# Patient Record
Sex: FEMALE | Race: WHITE | NOT HISPANIC OR LATINO | Employment: OTHER | URBAN - METROPOLITAN AREA
[De-identification: names, ages, dates, MRNs, and addresses within clinical notes are randomized per-mention and may not be internally consistent; named-entity substitution may affect disease eponyms.]

---

## 2019-02-18 ENCOUNTER — HOSPITAL ENCOUNTER (EMERGENCY)
Facility: HOSPITAL | Age: 34
Discharge: HOME | End: 2019-02-18
Attending: EMERGENCY MEDICINE
Payer: MEDICAID

## 2019-02-18 VITALS
OXYGEN SATURATION: 99 % | HEIGHT: 64 IN | SYSTOLIC BLOOD PRESSURE: 123 MMHG | WEIGHT: 135 LBS | HEART RATE: 63 BPM | BODY MASS INDEX: 23.05 KG/M2 | RESPIRATION RATE: 18 BRPM | TEMPERATURE: 99 F | DIASTOLIC BLOOD PRESSURE: 77 MMHG

## 2019-02-18 DIAGNOSIS — L08.9 INFECTED SEBACEOUS CYST: Primary | ICD-10-CM

## 2019-02-18 DIAGNOSIS — L72.3 INFECTED SEBACEOUS CYST: Primary | ICD-10-CM

## 2019-02-18 PROCEDURE — 10060 I&D ABSCESS SIMPLE/SINGLE: CPT

## 2019-02-18 PROCEDURE — 99283 EMERGENCY DEPT VISIT LOW MDM: CPT | Mod: 25

## 2019-02-18 PROCEDURE — 10061 I&D ABSCESS COMP/MULTIPLE: CPT

## 2019-02-18 PROCEDURE — 25000003 PHARM REV CODE 250: Performed by: EMERGENCY MEDICINE

## 2019-02-18 RX ORDER — IBUPROFEN 600 MG/1
600 TABLET ORAL
Status: COMPLETED | OUTPATIENT
Start: 2019-02-18 | End: 2019-02-18

## 2019-02-18 RX ORDER — SULFAMETHOXAZOLE AND TRIMETHOPRIM 800; 160 MG/1; MG/1
1 TABLET ORAL
Status: COMPLETED | OUTPATIENT
Start: 2019-02-18 | End: 2019-02-18

## 2019-02-18 RX ORDER — SULFAMETHOXAZOLE AND TRIMETHOPRIM 800; 160 MG/1; MG/1
1 TABLET ORAL 2 TIMES DAILY
Qty: 14 TABLET | Refills: 0 | Status: SHIPPED | OUTPATIENT
Start: 2019-02-18 | End: 2019-02-25

## 2019-02-18 RX ORDER — LIDOCAINE HCL/EPINEPHRINE/PF 2%-1:200K
10 VIAL (ML) INJECTION ONCE
Status: COMPLETED | OUTPATIENT
Start: 2019-02-18 | End: 2019-02-18

## 2019-02-18 RX ADMIN — SULFAMETHOXAZOLE AND TRIMETHOPRIM 1 TABLET: 800; 160 TABLET ORAL at 09:02

## 2019-02-18 RX ADMIN — IBUPROFEN 600 MG: 600 TABLET, FILM COATED ORAL at 09:02

## 2019-02-18 RX ADMIN — LIDOCAINE HYDROCHLORIDE,EPINEPHRINE BITARTRATE 10 ML: 20; .005 INJECTION, SOLUTION EPIDURAL; INFILTRATION; INTRACAUDAL; PERINEURAL at 09:02

## 2019-02-19 NOTE — ED PROVIDER NOTES
"Encounter Date: 2/18/2019    SCRIBE #1 NOTE: I, Riya Gil, am scribing for, and in the presence of, Dr. Basurto.       History     Chief Complaint   Patient presents with    Abscess     behind right ear        Time seen by provider: 9:32 PM on 02/18/2019    Sanjuanita Vo is a 33 y.o. female with no pertinent medical history who presents to the ED with ear pain onset today. Patient complains of a bump behind her right ear that has been present for a long time, however began feeling pain, warmth, and noticed redness to the area today. Patient denies any ear drainage, decreased hearing, shortness of breath, nausea, vomiting, cough, or fever. She reports having a frequent history of "cysts".         The history is provided by the patient. No  was used.     Review of patient's allergies indicates:  No Known Allergies  No past medical history on file.  Past Surgical History:   Procedure Laterality Date    DILATION AND CURETTAGE OF UTERUS  2007    DILATION AND CURETTAGE OF UTERUS  2007     No family history on file.  Social History     Tobacco Use    Smoking status: Current Every Day Smoker     Packs/day: 0.75     Years: 15.00     Pack years: 11.25    Smokeless tobacco: Never Used   Substance Use Topics    Alcohol use: Yes     Comment: occasionally    Drug use: No     Review of Systems   Constitutional: Negative for activity change, appetite change, chills, fatigue and fever.   HENT: Negative for congestion, drooling, ear discharge, facial swelling, sore throat and trouble swallowing.    Eyes: Negative for photophobia, redness and visual disturbance.   Respiratory: Negative for apnea, cough and shortness of breath.    Cardiovascular: Negative for chest pain and palpitations.   Gastrointestinal: Negative for abdominal distention, abdominal pain, diarrhea, nausea and vomiting.   Genitourinary: Negative for difficulty urinating, dysuria and hematuria.   Musculoskeletal: Negative for back pain " and neck pain.   Skin: Positive for color change. Negative for pallor and rash.   Neurological: Negative for weakness, numbness and headaches.   Hematological: Does not bruise/bleed easily.   Psychiatric/Behavioral: Negative for agitation and confusion.       Physical Exam     Initial Vitals [02/18/19 2130]   BP Pulse Resp Temp SpO2   123/77 63 18 99 °F (37.2 °C) 99 %      MAP       --         Physical Exam    Nursing note and vitals reviewed.  Constitutional: She appears well-developed and well-nourished.   HENT:   Head: Normocephalic and atraumatic.   1 cm right sided post-auricular nodule with erythema and tenderness.   Eyes: Conjunctivae are normal.   Neck: Normal range of motion. Neck supple.   Cardiovascular: Normal rate, regular rhythm and normal heart sounds. Exam reveals no gallop and no friction rub.    No murmur heard.  Pulmonary/Chest: Effort normal and breath sounds normal. No respiratory distress. She has no wheezes. She has no rhonchi. She has no rales.   Abdominal: Soft. She exhibits no distension. There is no tenderness.   Musculoskeletal: Normal range of motion.   Neurological: She is alert and oriented to person, place, and time.   Skin: Skin is warm and dry. There is erythema.   .    Psychiatric: She has a normal mood and affect.         ED Course   I & D - Incision and Drainage  Date/Time: 2/18/2019 9:45 PM  Location procedure was performed: [facility] EMERGENCY DEPARTMENT  Performed by: Aamir Basurto III, MD  Authorized by: Aamir Basurto III, MD   Consent Done: Not Needed  Type: cyst  Body area: head/neck (R post-auricular)    Anesthesia:  Local Anesthetic: lidocaine 2% with epinephrine  Anesthetic total: 1 mL  Patient sedated: no  Scalpel size: 11  Incision type: single straight  Complexity: simple  Drainage: pus and  purulent  Drainage amount: scant  Wound treatment: incision,  deloculation,  expression of material,  removal of cyst capsule and  wound left open  Complications: No  Specimens:  No  Implants: No  Patient tolerance: Patient tolerated the procedure well with no immediate complications  Comments: Covered with sterile, non stick gauze and tape.         Labs Reviewed - No data to display       Imaging Results    None          Medical Decision Making:   History:   Old Medical Records: I decided to obtain old medical records.  ED Management:  33-year-old female presents with retroauricular pain secondary to an infected sebaceous cyst.  Cyst is incised and drained.  She has no surrounding erythema with no evidence of cellulitis; however, she will be placed on Bactrim.  There is no evidence of mastoiditis.       APC / Resident Notes:   I, Dr. Aamir Basurto III, personally performed the services described in this documentation. All medical record entries made by the scribe were at my direction and in my presence.  I have reviewed the chart and agree that the record reflects my personal performance and is accurate and complete       Scribe Attestation:   Scribe #1: I performed the above scribed service and the documentation accurately describes the services I performed. I attest to the accuracy of the note.               Clinical Impression:   The encounter diagnosis was Infected sebaceous cyst.      Disposition:   Disposition: Discharged  Condition: Stable                        Aamir Basurto III, MD  02/20/19 0906

## 2019-11-11 ENCOUNTER — HOSPITAL ENCOUNTER (EMERGENCY)
Facility: HOSPITAL | Age: 34
Discharge: HOME | End: 2019-11-11
Attending: EMERGENCY MEDICINE
Payer: MEDICAID

## 2019-11-11 VITALS
RESPIRATION RATE: 16 BRPM | TEMPERATURE: 99 F | OXYGEN SATURATION: 100 % | DIASTOLIC BLOOD PRESSURE: 78 MMHG | WEIGHT: 145 LBS | HEART RATE: 76 BPM | SYSTOLIC BLOOD PRESSURE: 115 MMHG | BODY MASS INDEX: 24.89 KG/M2

## 2019-11-11 DIAGNOSIS — L03.90 CELLULITIS, UNSPECIFIED CELLULITIS SITE: ICD-10-CM

## 2019-11-11 DIAGNOSIS — M79.645 FINGER PAIN, LEFT: Primary | ICD-10-CM

## 2019-11-11 LAB
B-HCG UR QL: POSITIVE
CTP QC/QA: YES

## 2019-11-11 PROCEDURE — 99283 EMERGENCY DEPT VISIT LOW MDM: CPT

## 2019-11-11 PROCEDURE — 81025 URINE PREGNANCY TEST: CPT | Performed by: EMERGENCY MEDICINE

## 2019-11-11 RX ORDER — SULFAMETHOXAZOLE AND TRIMETHOPRIM 800; 160 MG/1; MG/1
1 TABLET ORAL 2 TIMES DAILY
Qty: 14 TABLET | Refills: 0 | Status: SHIPPED | OUTPATIENT
Start: 2019-11-11 | End: 2019-11-11 | Stop reason: CLARIF

## 2019-11-11 RX ORDER — CLINDAMYCIN HYDROCHLORIDE 300 MG/1
300 CAPSULE ORAL EVERY 6 HOURS
Qty: 40 CAPSULE | Refills: 0 | Status: SHIPPED | OUTPATIENT
Start: 2019-11-11 | End: 2019-11-21

## 2019-11-11 RX ORDER — HYDROCODONE BITARTRATE AND ACETAMINOPHEN 10; 325 MG/1; MG/1
1 TABLET ORAL EVERY 6 HOURS PRN
Qty: 12 TABLET | Refills: 0 | Status: SHIPPED | OUTPATIENT
Start: 2019-11-11 | End: 2019-11-11 | Stop reason: CLARIF

## 2019-11-11 RX ORDER — MUPIROCIN 20 MG/G
OINTMENT TOPICAL 3 TIMES DAILY
Qty: 1 TUBE | Refills: 0 | Status: ON HOLD | OUTPATIENT
Start: 2019-11-11 | End: 2020-06-25

## 2019-11-11 NOTE — ED PROVIDER NOTES
Encounter Date: 11/11/2019       History     Chief Complaint   Patient presents with    Hand Pain     L hand index finger pain     Chief complaint is left index finger pain.  It began tonight.  She has redness and pain to the ulnar aspect of the distal finger tip of the left index finger.  No paronychia noted. Good range of motion.        Review of patient's allergies indicates:  No Known Allergies  No past medical history on file.  Past Surgical History:   Procedure Laterality Date    DILATION AND CURETTAGE OF UTERUS  2007    DILATION AND CURETTAGE OF UTERUS  2007     No family history on file.  Social History     Tobacco Use    Smoking status: Current Every Day Smoker     Packs/day: 0.75     Years: 15.00     Pack years: 11.25    Smokeless tobacco: Never Used   Substance Use Topics    Alcohol use: Yes     Comment: occasionally    Drug use: No     Review of Systems   Constitutional: Negative for chills and fever.   HENT: Negative for ear pain, rhinorrhea and sore throat.    Eyes: Negative for pain and visual disturbance.   Respiratory: Negative for cough and shortness of breath.    Cardiovascular: Negative for chest pain and palpitations.   Gastrointestinal: Negative for abdominal pain, constipation, diarrhea, nausea and vomiting.   Genitourinary: Negative for dysuria, frequency, hematuria and urgency.   Musculoskeletal: Negative for back pain, joint swelling and myalgias.   Skin: Negative for rash.        Finger pain   Neurological: Negative for dizziness, seizures, weakness and headaches.   Psychiatric/Behavioral: Negative for dysphoric mood. The patient is not nervous/anxious.        Physical Exam     Initial Vitals [11/11/19 0219]   BP Pulse Resp Temp SpO2   107/66 78 16 98.2 °F (36.8 °C) --      MAP       --         Physical Exam    Nursing note and vitals reviewed.  Constitutional: She appears well-developed and well-nourished.   HENT:   Head: Normocephalic and atraumatic.   Eyes: Conjunctivae, EOM and  lids are normal. Pupils are equal, round, and reactive to light.   Neck: Trachea normal and normal range of motion. Neck supple. No thyroid mass and no thyromegaly present.   Cardiovascular: Normal rate, regular rhythm and normal heart sounds.   Pulmonary/Chest: Effort normal and breath sounds normal.   Abdominal: Soft. Normal appearance and bowel sounds are normal. There is no tenderness.   Musculoskeletal: Normal range of motion.   Neurological: She is alert and oriented to person, place, and time. She has normal strength and normal reflexes. No cranial nerve deficit or sensory deficit.   Skin: Skin is warm and dry.   Patient has redness and tenderness to the outer aspect of the left index finger tip.  No paronychia noted.  Her nails are cut close to the skin.  No fluctuance noted no drainage noted.  Full range of motion of the finger tip neurovascular function intact   Psychiatric: She has a normal mood and affect. Her speech is normal and behavior is normal. Judgment and thought content normal.         ED Course   Procedures  Labs Reviewed - No data to display       Imaging Results    None                                          Clinical Impression:       ICD-10-CM ICD-9-CM   1. Finger pain, left M79.645 729.5   2. Cellulitis, unspecified cellulitis site L03.90 682.9                             Carolann Cohen MD  11/12/19 0105

## 2019-11-12 LAB
ABO + RH BLD: NORMAL
C TRACH RRNA SPEC QL PROBE: NEGATIVE
HBV SURFACE AG SERPL QL IA: NEGATIVE
HIV 1+2 AB+HIV1 P24 AG SERPL QL IA: NEGATIVE
INDIRECT COOMBS: NEGATIVE
N GONORRHOEAE, AMPLIFIED DNA: NEGATIVE
RPR: NON REACTIVE
RUBELLA IMMUNE STATUS: NORMAL

## 2020-06-25 ENCOUNTER — ANESTHESIA EVENT (OUTPATIENT)
Dept: OBSTETRICS AND GYNECOLOGY | Facility: HOSPITAL | Age: 35
End: 2020-06-25
Payer: MEDICAID

## 2020-06-25 ENCOUNTER — ANESTHESIA (OUTPATIENT)
Dept: OBSTETRICS AND GYNECOLOGY | Facility: HOSPITAL | Age: 35
End: 2020-06-25
Payer: MEDICAID

## 2020-06-25 ENCOUNTER — HOSPITAL ENCOUNTER (INPATIENT)
Facility: HOSPITAL | Age: 35
LOS: 2 days | Discharge: HOME | End: 2020-06-27
Attending: SPECIALIST | Admitting: SPECIALIST
Payer: MEDICAID

## 2020-06-25 DIAGNOSIS — R10.9 ABDOMINAL PAIN AFFECTING PREGNANCY: ICD-10-CM

## 2020-06-25 DIAGNOSIS — O26.899 ABDOMINAL PAIN AFFECTING PREGNANCY: ICD-10-CM

## 2020-06-25 DIAGNOSIS — Z37.9 NORMAL LABOR: ICD-10-CM

## 2020-06-25 LAB
ABO + RH BLD: NORMAL
AMPHET+METHAMPHET UR QL: NEGATIVE
BARBITURATES UR QL SCN>200 NG/ML: NORMAL
BASOPHILS # BLD AUTO: 0.07 K/UL (ref 0–0.2)
BASOPHILS NFR BLD: 0.6 % (ref 0–1.9)
BENZODIAZ UR QL SCN>200 NG/ML: NEGATIVE
BILIRUB UR QL STRIP: NEGATIVE
BLD GP AB SCN CELLS X3 SERPL QL: NORMAL
BZE UR QL SCN: NEGATIVE
CANNABINOIDS UR QL SCN: NEGATIVE
CLARITY UR: CLEAR
COLOR UR: YELLOW
CREAT UR-MCNC: 48 MG/DL (ref 15–325)
CTP QC/QA: YES
DIFFERENTIAL METHOD: ABNORMAL
EOSINOPHIL # BLD AUTO: 0.1 K/UL (ref 0–0.5)
EOSINOPHIL NFR BLD: 0.9 % (ref 0–8)
ERYTHROCYTE [DISTWIDTH] IN BLOOD BY AUTOMATED COUNT: 14.1 % (ref 11.5–14.5)
GLUCOSE UR QL STRIP: NEGATIVE
HCT VFR BLD AUTO: 35.1 % (ref 37–48.5)
HGB BLD-MCNC: 11.8 G/DL (ref 12–16)
HGB UR QL STRIP: ABNORMAL
IMM GRANULOCYTES # BLD AUTO: 0.03 K/UL (ref 0–0.04)
IMM GRANULOCYTES NFR BLD AUTO: 0.3 % (ref 0–0.5)
KETONES UR QL STRIP: ABNORMAL
LEUKOCYTE ESTERASE UR QL STRIP: NEGATIVE
LYMPHOCYTES # BLD AUTO: 2.2 K/UL (ref 1–4.8)
LYMPHOCYTES NFR BLD: 20.4 % (ref 18–48)
MCH RBC QN AUTO: 29.1 PG (ref 27–31)
MCHC RBC AUTO-ENTMCNC: 33.6 G/DL (ref 32–36)
MCV RBC AUTO: 87 FL (ref 82–98)
MONOCYTES # BLD AUTO: 1 K/UL (ref 0.3–1)
MONOCYTES NFR BLD: 9.4 % (ref 4–15)
NEUTROPHILS # BLD AUTO: 7.5 K/UL (ref 1.8–7.7)
NEUTROPHILS NFR BLD: 68.4 % (ref 38–73)
NITRITE UR QL STRIP: NEGATIVE
NRBC BLD-RTO: 0 /100 WBC
OPIATES UR QL SCN: NEGATIVE
PCP UR QL SCN>25 NG/ML: NEGATIVE
PCP UR QL SCN>25 NG/ML: NEGATIVE
PH UR STRIP: 8 [PH] (ref 5–8)
PLATELET # BLD AUTO: 316 K/UL (ref 150–350)
PMV BLD AUTO: 9.8 FL (ref 9.2–12.9)
POC PH, VAGINAL: 4.5 (ref 4.5–5.5)
PROT UR QL STRIP: NEGATIVE
RBC # BLD AUTO: 4.05 M/UL (ref 4–5.4)
RUPTURE OF MEMBRANE: POSITIVE
SARS-COV-2 RDRP RESP QL NAA+PROBE: NEGATIVE
SP GR UR STRIP: 1.01 (ref 1–1.03)
TOXICOLOGY INFORMATION: NORMAL
URN SPEC COLLECT METH UR: ABNORMAL
UROBILINOGEN UR STRIP-ACNC: NEGATIVE EU/DL
WBC # BLD AUTO: 10.97 K/UL (ref 3.9–12.7)

## 2020-06-25 PROCEDURE — 81003 URINALYSIS AUTO W/O SCOPE: CPT

## 2020-06-25 PROCEDURE — 86850 RBC ANTIBODY SCREEN: CPT

## 2020-06-25 PROCEDURE — U0002 COVID-19 LAB TEST NON-CDC: HCPCS

## 2020-06-25 PROCEDURE — 72200004 HC VAGINAL DELIVERY LEVEL I

## 2020-06-25 PROCEDURE — 25000003 PHARM REV CODE 250: Performed by: SPECIALIST

## 2020-06-25 PROCEDURE — 12000002 HC ACUTE/MED SURGE SEMI-PRIVATE ROOM

## 2020-06-25 PROCEDURE — 80307 DRUG TEST PRSMV CHEM ANLYZR: CPT

## 2020-06-25 PROCEDURE — 63600175 PHARM REV CODE 636 W HCPCS: Performed by: STUDENT IN AN ORGANIZED HEALTH CARE EDUCATION/TRAINING PROGRAM

## 2020-06-25 PROCEDURE — 86592 SYPHILIS TEST NON-TREP QUAL: CPT

## 2020-06-25 PROCEDURE — 63600175 PHARM REV CODE 636 W HCPCS: Performed by: SPECIALIST

## 2020-06-25 PROCEDURE — 62326 NJX INTERLAMINAR LMBR/SAC: CPT | Performed by: STUDENT IN AN ORGANIZED HEALTH CARE EDUCATION/TRAINING PROGRAM

## 2020-06-25 PROCEDURE — C1751 CATH, INF, PER/CENT/MIDLINE: HCPCS | Performed by: STUDENT IN AN ORGANIZED HEALTH CARE EDUCATION/TRAINING PROGRAM

## 2020-06-25 PROCEDURE — 85025 COMPLETE CBC W/AUTO DIFF WBC: CPT

## 2020-06-25 RX ORDER — ONDANSETRON 4 MG/1
8 TABLET, ORALLY DISINTEGRATING ORAL EVERY 8 HOURS PRN
Status: DISCONTINUED | OUTPATIENT
Start: 2020-06-25 | End: 2020-06-27 | Stop reason: HOSPADM

## 2020-06-25 RX ORDER — BUTALBITAL, ACETAMINOPHEN AND CAFFEINE 50; 325; 40 MG/1; MG/1; MG/1
1 TABLET ORAL EVERY 6 HOURS PRN
COMMUNITY

## 2020-06-25 RX ORDER — DIPHENHYDRAMINE HCL 25 MG
25 CAPSULE ORAL EVERY 4 HOURS PRN
Status: DISCONTINUED | OUTPATIENT
Start: 2020-06-25 | End: 2020-06-27 | Stop reason: HOSPADM

## 2020-06-25 RX ORDER — SIMETHICONE 80 MG
1 TABLET,CHEWABLE ORAL 4 TIMES DAILY PRN
Status: DISCONTINUED | OUTPATIENT
Start: 2020-06-25 | End: 2020-06-25

## 2020-06-25 RX ORDER — DIPHENHYDRAMINE HYDROCHLORIDE 50 MG/ML
12.5 INJECTION INTRAMUSCULAR; INTRAVENOUS EVERY 4 HOURS PRN
Status: DISCONTINUED | OUTPATIENT
Start: 2020-06-25 | End: 2020-06-25

## 2020-06-25 RX ORDER — BUTORPHANOL TARTRATE 2 MG/ML
2 INJECTION INTRAMUSCULAR; INTRAVENOUS
Status: DISCONTINUED | OUTPATIENT
Start: 2020-06-25 | End: 2020-06-25

## 2020-06-25 RX ORDER — DOCUSATE SODIUM 100 MG/1
200 CAPSULE, LIQUID FILLED ORAL 2 TIMES DAILY PRN
Status: DISCONTINUED | OUTPATIENT
Start: 2020-06-25 | End: 2020-06-27 | Stop reason: HOSPADM

## 2020-06-25 RX ORDER — HYDROCORTISONE 25 MG/G
CREAM TOPICAL 3 TIMES DAILY PRN
Status: DISCONTINUED | OUTPATIENT
Start: 2020-06-25 | End: 2020-06-27 | Stop reason: HOSPADM

## 2020-06-25 RX ORDER — CALCIUM CARBONATE 200(500)MG
500 TABLET,CHEWABLE ORAL 3 TIMES DAILY PRN
Status: DISCONTINUED | OUTPATIENT
Start: 2020-06-25 | End: 2020-06-25

## 2020-06-25 RX ORDER — SODIUM CHLORIDE, SODIUM LACTATE, POTASSIUM CHLORIDE, CALCIUM CHLORIDE 600; 310; 30; 20 MG/100ML; MG/100ML; MG/100ML; MG/100ML
INJECTION, SOLUTION INTRAVENOUS CONTINUOUS
Status: DISCONTINUED | OUTPATIENT
Start: 2020-06-25 | End: 2020-06-25

## 2020-06-25 RX ORDER — BUTORPHANOL TARTRATE 2 MG/ML
1 INJECTION INTRAMUSCULAR; INTRAVENOUS
Status: DISCONTINUED | OUTPATIENT
Start: 2020-06-25 | End: 2020-06-25

## 2020-06-25 RX ORDER — FENTANYL/BUPIVACAINE/NS/PF 2-625MCG/1
PLASTIC BAG, INJECTION (ML) INJECTION CONTINUOUS
Status: DISCONTINUED | OUTPATIENT
Start: 2020-06-25 | End: 2020-06-27 | Stop reason: HOSPADM

## 2020-06-25 RX ORDER — TERBINAFINE HYDROCHLORIDE 250 MG/1
250 TABLET ORAL DAILY
COMMUNITY

## 2020-06-25 RX ORDER — DIPHENHYDRAMINE HYDROCHLORIDE 50 MG/ML
25 INJECTION INTRAMUSCULAR; INTRAVENOUS EVERY 4 HOURS PRN
Status: DISCONTINUED | OUTPATIENT
Start: 2020-06-25 | End: 2020-06-27 | Stop reason: HOSPADM

## 2020-06-25 RX ORDER — ACETAMINOPHEN 500 MG
500 TABLET ORAL EVERY 6 HOURS PRN
Status: DISCONTINUED | OUTPATIENT
Start: 2020-06-25 | End: 2020-06-25

## 2020-06-25 RX ORDER — OXYCODONE AND ACETAMINOPHEN 5; 325 MG/1; MG/1
1 TABLET ORAL EVERY 4 HOURS PRN
Status: DISCONTINUED | OUTPATIENT
Start: 2020-06-25 | End: 2020-06-27 | Stop reason: HOSPADM

## 2020-06-25 RX ORDER — PROMETHAZINE HYDROCHLORIDE 25 MG/1
25 TABLET ORAL EVERY 6 HOURS PRN
Status: DISCONTINUED | OUTPATIENT
Start: 2020-06-25 | End: 2020-06-27 | Stop reason: HOSPADM

## 2020-06-25 RX ORDER — ONDANSETRON 4 MG/1
8 TABLET, ORALLY DISINTEGRATING ORAL EVERY 8 HOURS PRN
Status: DISCONTINUED | OUTPATIENT
Start: 2020-06-25 | End: 2020-06-25

## 2020-06-25 RX ORDER — NALOXONE HCL 0.4 MG/ML
0.4 VIAL (ML) INJECTION SEE ADMIN INSTRUCTIONS
Status: DISCONTINUED | OUTPATIENT
Start: 2020-06-25 | End: 2020-06-25

## 2020-06-25 RX ORDER — EPHEDRINE SULFATE 50 MG/ML
10 INJECTION, SOLUTION INTRAVENOUS ONCE AS NEEDED
Status: DISCONTINUED | OUTPATIENT
Start: 2020-06-25 | End: 2020-06-25

## 2020-06-25 RX ORDER — EPHEDRINE SULFATE 50 MG/ML
5 INJECTION, SOLUTION INTRAVENOUS ONCE
Status: COMPLETED | OUTPATIENT
Start: 2020-06-25 | End: 2020-06-25

## 2020-06-25 RX ORDER — OXYCODONE AND ACETAMINOPHEN 10; 325 MG/1; MG/1
1 TABLET ORAL EVERY 4 HOURS PRN
Status: DISCONTINUED | OUTPATIENT
Start: 2020-06-25 | End: 2020-06-27 | Stop reason: HOSPADM

## 2020-06-25 RX ORDER — OXYTOCIN-SODIUM CHLORIDE 0.9% IV SOLN 30 UNIT/500ML 30-0.9/5 UT/ML-%
30 SOLUTION INTRAVENOUS ONCE
Status: DISCONTINUED | OUTPATIENT
Start: 2020-06-26 | End: 2020-06-27 | Stop reason: HOSPADM

## 2020-06-25 RX ORDER — OXYTOCIN-SODIUM CHLORIDE 0.9% IV SOLN 30 UNIT/500ML 30-0.9/5 UT/ML-%
2 SOLUTION INTRAVENOUS CONTINUOUS
Status: DISCONTINUED | OUTPATIENT
Start: 2020-06-25 | End: 2020-06-25

## 2020-06-25 RX ORDER — MISOPROSTOL 100 UG/1
600 TABLET ORAL
Status: DISCONTINUED | OUTPATIENT
Start: 2020-06-25 | End: 2020-06-25

## 2020-06-25 RX ORDER — SODIUM CHLORIDE 9 MG/ML
INJECTION, SOLUTION INTRAVENOUS
Status: DISCONTINUED | OUTPATIENT
Start: 2020-06-25 | End: 2020-06-25

## 2020-06-25 RX ORDER — ONDANSETRON 2 MG/ML
4 INJECTION INTRAMUSCULAR; INTRAVENOUS ONCE
Status: COMPLETED | OUTPATIENT
Start: 2020-06-25 | End: 2020-06-25

## 2020-06-25 RX ADMIN — ONDANSETRON 4 MG: 2 INJECTION INTRAMUSCULAR; INTRAVENOUS at 09:06

## 2020-06-25 RX ADMIN — DEXTROSE 2.5 MILLION UNITS: 50 INJECTION, SOLUTION INTRAVENOUS at 05:06

## 2020-06-25 RX ADMIN — PROMETHAZINE HYDROCHLORIDE 12.5 MG: 25 INJECTION INTRAMUSCULAR; INTRAVENOUS at 05:06

## 2020-06-25 RX ADMIN — SODIUM CHLORIDE, SODIUM LACTATE, POTASSIUM CHLORIDE, AND CALCIUM CHLORIDE: .6; .31; .03; .02 INJECTION, SOLUTION INTRAVENOUS at 07:06

## 2020-06-25 RX ADMIN — PENICILLIN G POTASSIUM 5 MILLION UNITS: 5000000 INJECTION, POWDER, FOR SOLUTION INTRAMUSCULAR; INTRAVENOUS at 01:06

## 2020-06-25 RX ADMIN — DEXTROSE 2.5 MILLION UNITS: 50 INJECTION, SOLUTION INTRAVENOUS at 09:06

## 2020-06-25 RX ADMIN — Medication 2 MILLI-UNITS/MIN: at 01:06

## 2020-06-25 RX ADMIN — OXYCODONE AND ACETAMINOPHEN 1 TABLET: 5; 325 TABLET ORAL at 10:06

## 2020-06-25 RX ADMIN — EPHEDRINE SULFATE 5 MG: 50 INJECTION, SOLUTION INTRAVENOUS at 09:06

## 2020-06-25 RX ADMIN — BUTORPHANOL TARTRATE 2 MG: 2 INJECTION, SOLUTION INTRAMUSCULAR; INTRAVENOUS at 05:06

## 2020-06-25 RX ADMIN — SODIUM CHLORIDE, SODIUM LACTATE, POTASSIUM CHLORIDE, AND CALCIUM CHLORIDE: .6; .31; .03; .02 INJECTION, SOLUTION INTRAVENOUS at 01:06

## 2020-06-25 NOTE — NURSING
1120- pt presents to unit with c/o abdimal pain and leaking fluid since 5pm yesterday. Oriented to LR 5.     1130- ROM plus performed, pink tinged fluid noted to q tip.     1132- Pt instructed to bear down and cough. Small trickle of blood tinged fluid noted from vagina. Fluid tested with nitrazine and reslults negative.     1150- Dr. Sood notified of pt's arrival, complaints and assessment findings. New order for u/s for ESTHER given.     ROM plus positive    1155- Exp of plan of care discussed with patient and sig other. Verb understanding.     1240- u/s complete. Verbal report of ESTHER 4.75.     1250- Dr. Sood notified of u/s results and that pt still noted with leaking. Admit orders given for labor/ SROM

## 2020-06-25 NOTE — PLAN OF CARE
Ongoing assessment, acceptable pain management, Continuous EFM and tocometry. Vaginal bleeding wnl, anticipate . Reassess pt needs prn

## 2020-06-26 LAB
BASOPHILS # BLD AUTO: 0.04 K/UL (ref 0–0.2)
BASOPHILS NFR BLD: 0.2 % (ref 0–1.9)
DIFFERENTIAL METHOD: ABNORMAL
EOSINOPHIL # BLD AUTO: 0.2 K/UL (ref 0–0.5)
EOSINOPHIL NFR BLD: 1 % (ref 0–8)
ERYTHROCYTE [DISTWIDTH] IN BLOOD BY AUTOMATED COUNT: 13.9 % (ref 11.5–14.5)
HCT VFR BLD AUTO: 35.1 % (ref 37–48.5)
HGB BLD-MCNC: 11.4 G/DL (ref 12–16)
IMM GRANULOCYTES # BLD AUTO: 0.09 K/UL (ref 0–0.04)
IMM GRANULOCYTES NFR BLD AUTO: 0.5 % (ref 0–0.5)
LYMPHOCYTES # BLD AUTO: 3 K/UL (ref 1–4.8)
LYMPHOCYTES NFR BLD: 17.8 % (ref 18–48)
MCH RBC QN AUTO: 28.9 PG (ref 27–31)
MCHC RBC AUTO-ENTMCNC: 32.5 G/DL (ref 32–36)
MCV RBC AUTO: 89 FL (ref 82–98)
MONOCYTES # BLD AUTO: 1.9 K/UL (ref 0.3–1)
MONOCYTES NFR BLD: 11.2 % (ref 4–15)
NEUTROPHILS # BLD AUTO: 11.5 K/UL (ref 1.8–7.7)
NEUTROPHILS NFR BLD: 69.3 % (ref 38–73)
NRBC BLD-RTO: 0 /100 WBC
PLATELET # BLD AUTO: 275 K/UL (ref 150–350)
PMV BLD AUTO: 10.3 FL (ref 9.2–12.9)
RBC # BLD AUTO: 3.94 M/UL (ref 4–5.4)
RPR SER QL: NORMAL
WBC # BLD AUTO: 16.66 K/UL (ref 3.9–12.7)

## 2020-06-26 PROCEDURE — 25000003 PHARM REV CODE 250: Performed by: SPECIALIST

## 2020-06-26 PROCEDURE — 36415 COLL VENOUS BLD VENIPUNCTURE: CPT

## 2020-06-26 PROCEDURE — 85025 COMPLETE CBC W/AUTO DIFF WBC: CPT

## 2020-06-26 PROCEDURE — 12000002 HC ACUTE/MED SURGE SEMI-PRIVATE ROOM

## 2020-06-26 RX ADMIN — OXYCODONE HYDROCHLORIDE AND ACETAMINOPHEN 1 TABLET: 10; 325 TABLET ORAL at 07:06

## 2020-06-26 RX ADMIN — IBUPROFEN 600 MG: 400 TABLET, FILM COATED ORAL at 07:06

## 2020-06-26 RX ADMIN — OXYCODONE HYDROCHLORIDE AND ACETAMINOPHEN 1 TABLET: 10; 325 TABLET ORAL at 11:06

## 2020-06-26 RX ADMIN — IBUPROFEN 600 MG: 400 TABLET, FILM COATED ORAL at 06:06

## 2020-06-26 RX ADMIN — IBUPROFEN 600 MG: 400 TABLET, FILM COATED ORAL at 01:06

## 2020-06-26 RX ADMIN — OXYCODONE AND ACETAMINOPHEN 1 TABLET: 5; 325 TABLET ORAL at 01:06

## 2020-06-26 RX ADMIN — DOCUSATE SODIUM 200 MG: 100 CAPSULE, LIQUID FILLED ORAL at 08:06

## 2020-06-26 RX ADMIN — DOCUSATE SODIUM 200 MG: 100 CAPSULE, LIQUID FILLED ORAL at 09:06

## 2020-06-26 RX ADMIN — IBUPROFEN 600 MG: 400 TABLET, FILM COATED ORAL at 12:06

## 2020-06-26 RX ADMIN — OXYCODONE HYDROCHLORIDE AND ACETAMINOPHEN 1 TABLET: 10; 325 TABLET ORAL at 02:06

## 2020-06-26 RX ADMIN — OXYCODONE HYDROCHLORIDE AND ACETAMINOPHEN 1 TABLET: 10; 325 TABLET ORAL at 06:06

## 2020-06-26 RX ADMIN — BENZOCAINE AND LEVOMENTHOL: 200; 5 SPRAY TOPICAL at 02:06

## 2020-06-26 NOTE — ANESTHESIA PREPROCEDURE EVALUATION
06/25/2020  Sanjuanita Vo is a 35 y.o., female.      Patient Active Problem List   Diagnosis    Tobacco use in pregnancy    Pregnancy complication    Normal spontaneous vaginal delivery    Abdominal pain affecting pregnancy    Normal labor       Past Surgical History:   Procedure Laterality Date    DILATION AND CURETTAGE OF UTERUS  2007    DILATION AND CURETTAGE OF UTERUS  2007        Tobacco Use:  The patient  reports that she has been smoking. She has a 11.25 pack-year smoking history. She has never used smokeless tobacco.     No results found for this or any previous visit.          Lab Results   Component Value Date    WBC 10.97 06/25/2020    HGB 11.8 (L) 06/25/2020    HCT 35.1 (L) 06/25/2020    MCV 87 06/25/2020     06/25/2020     BMP  Lab Results   Component Value Date     (L) 06/27/2013    K 3.8 06/27/2013     06/27/2013    CO2 20 (L) 06/27/2013    BUN 9 06/27/2013    CREATININE 0.7 06/27/2013    CALCIUM 9.6 06/27/2013    ANIONGAP 12 06/27/2013       No results found for this or any previous visit.          Anesthesia Evaluation    I have reviewed the Patient Summary Reports.      I have reviewed the Medications.     Review of Systems  Anesthesia Hx:  No problems with previous Anesthesia Denies Hx of Anesthetic complications  Denies Family Hx of Anesthesia complications.   Denies Personal Hx of Anesthesia complications.   Social:  Smoker    Hematology/Oncology:  Hematology Normal   Oncology Normal     EENT/Dental:EENT/Dental Normal   Cardiovascular:  Cardiovascular Normal     Pulmonary:  Pulmonary Normal    Renal/:  Renal/ Normal     Hepatic/GI:  Hepatic/GI Normal    Musculoskeletal:  Musculoskeletal Normal    Neurological:  Neurology Normal    Endocrine:  Endocrine Normal    Psych:  Psychiatric Normal           Physical Exam  General:  Well nourished     Airway/Jaw/Neck:  Airway Findings: Mouth Opening: Normal Tongue: Normal  General Airway Assessment: Adult  Mallampati: II  TM Distance: Normal, at least 6 cm  Jaw/Neck Findings:  Neck ROM: Normal ROM      Dental:  Dental Findings:   Chest/Lungs:  Chest/Lungs Findings: Clear to auscultation, Normal Respiratory Rate     Heart/Vascular:  Heart Findings: Rate: Normal  Rhythm: Regular Rhythm  Sounds: Normal        Mental Status:  Mental Status Findings:  Alert and Oriented         Anesthesia Plan  Type of Anesthesia, risks & benefits discussed:  Anesthesia Type:  epidural  Patient's Preference:   Intra-op Monitoring Plan: standard ASA monitors  Intra-op Monitoring Plan Comments:   Post Op Pain Control Plan:   Post Op Pain Control Plan Comments:   Induction:    Beta Blocker:         Informed Consent: Patient understands risks and agrees with Anesthesia plan.  Questions answered. Anesthesia consent signed with patient.  ASA Score: 2     Day of Surgery Review of History & Physical:            Ready For Surgery From Anesthesia Perspective.

## 2020-06-26 NOTE — HOSPITAL COURSE
Patient underwent vaginal delivery complications at 36 weeks and 4 days received penicillin for unknown GBS and pre term.  Boy baby

## 2020-06-26 NOTE — PLAN OF CARE
S/P  of viable baby boy. Pt AAOx4. VSS. Fundus firm @ 2below umbilicus, lochia rubra and small. Pt's pain controlled per PRN pain medication. NADN. Will continue to monitor.

## 2020-06-26 NOTE — PLAN OF CARE
SW met with patient to complete OB initial discharge planning assessment. Patient lives with SO, 2 children, and now baby. Patient has access to a car seat and plans to formula feed. Patient has access to diapers and understands process of how to apply for WIC. Patient stated they have no needs from /case management staff at this time.    SW also addressed consult stating pt and baby tested positive for barbiturates. Pt showed this SW a pill bottle for Butalbital/acetaminophen prescribed by Dr. Sood, which pt stated she took on Wednesday. SW called Dr. Sood who confirmed prescription and that this could cause pt's drug screen to be positive. No further needs/concerns at this time.     06/26/20 1500   Discharge Assessment   Assessment Type Discharge Planning Assessment   Confirmed/corrected address and phone number on facesheet? Yes   Assessment information obtained from? Patient   Prior to hospitilization cognitive status: Alert/Oriented   Prior to hospitalization functional status: Independent   Current cognitive status: Alert/Oriented   Current Functional Status: Independent   Lives With child(pennie), dependent;significant other   Able to Return to Prior Arrangements yes   Is patient able to care for self after discharge? Yes   Discharge Plan A Home   Discharge Plan B Home   DME Needed Upon Discharge  none   Patient/Family in Agreement with Plan yes

## 2020-06-26 NOTE — ANESTHESIA PROCEDURE NOTES
Epidural    Patient location during procedure: OB   Reason for block: primary anesthetic   Diagnosis: Intrauterine Pregnancy   Start time: 6/25/2020 8:56 PM  Timeout: 6/25/2020 8:56 PM  End time: 6/25/2020 8:56 PM    Staffing  Performing Provider: Darian Barraza MD  Authorizing Provider: Darian Barraza MD        Preanesthetic Checklist  Completed: patient identified, pre-op evaluation, timeout performed, IV checked, risks and benefits discussed, monitors and equipment checked, anesthesia consent given, hand hygiene performed and patient being monitored  Preparation  Patient position: sitting  Prep: ChloraPrep  Patient monitoring: ECG and Blood Pressure  Epidural  Skin Anesthetic: lidocaine 1%  Skin Wheal: 3 mL  Administration type: single shot  Approach: midline  Interspace: L3-4    Injection technique: TESSIE air  Needle and Epidural Catheter  Needle type: Tuohy   Needle gauge: 17  Needle length: 3.5 inches  Needle insertion depth: 5 cm  Catheter type: springwound and multi-orifice  Catheter size: 18 G  Catheter at skin depth: 10 cm  Test dose: 3 mL of lidocaine 1.5% with Epi 1-to-200,000  Additional Documentation: negative aspiration for heme and CSF, no paresthesia on injection, no significant complaints from patient, no significant pain on injection, no signs/symptoms of IV or SA injection and incremental injection  Needle localization: anatomical landmarks  Assessment  Ease of block: easy  Patient's tolerance of the procedure: comfortable throughout block and no complaints  Additional Notes  10 ml of 2% Lidocaine with epinephrine given through epidural catheter in 5 ml increments.   No inadvertent dural puncture with Tuohy.  Dural puncture not performed with spinal needle

## 2020-06-26 NOTE — HPI
Patient presenting at 30 so 6 weeks and 4 days with spontaneous rupture membranes underwent Pitocin augmentation with vaginal delivery.

## 2020-06-26 NOTE — NURSING TRANSFER
Nursing Transfer Note      6/26/2020     Transfer FROM L&D to Wing 2100; Rm 2104    Transfer via wheelchair    Transfer with pt belongings    Transported by RN    Medicines sent: N/A    Chart send with patient: Yes    Notified: significant other (present with pt)    Upon arrival to floor: Pt AAOx4. Pt ambulated form wheelchair to restroom then to bed without any difficulties noted. Bedside report given to Jessica RN & savanna, RN @1458 . Fundus & bleeding assessed with Nurses. ELIZABETH.

## 2020-06-26 NOTE — L&D DELIVERY NOTE
[facility]  Vaginal Delivery Note    SUMMARY     Patient underwent normal spontaneous vaginal delivery over an intact perineum with first-degree periurethral laceration with repair.  The placenta was delivered intact and spontaneous.  The uterus and vagina were swept clean. The patient had an epidural for anesthesia . The estimated blood loss was 150 cc.  No other complications were noted. Baby delivered was a male infant with Apgar 8 9. .  Patient tolerated delivery well. Sponge needle and lap counted correctly x2. Both mother and baby are in stable condition.        Indications: Vaginal delivery  Pregnancy complicated by:   Patient Active Problem List   Diagnosis    Tobacco use in pregnancy    Pregnancy complication    Normal spontaneous vaginal delivery    Abdominal pain affecting pregnancy    Normal labor    Vaginal delivery     Admitting GA: 36w4d

## 2020-06-26 NOTE — PROGRESS NOTES
"  Postpartum Day 1 Vaginal Delivery    Sanjuanita Vo is doing well without complaints. She denies any problems with pp blues, is ambulating well, is voiding without difficulty, and states bleeding is not heavy. Her pain level is being controlled with pain meds.      OBJECTIVE:     Vital Signs (Most Recent):  /83 (BP Location: Right arm, Patient Position: Lying)   Pulse 90   Temp 97.6 °F (36.4 °C) (Oral)   Resp 18   Ht 5' 4" (1.626 m)   Wt 83.9 kg (185 lb)   LMP 10/04/2019   SpO2 97%   Breastfeeding Unknown   BMI 31.76 kg/m²       Vital Signs Range (Last 24H):  Reviewed    I & O (Last 24H):  Intake/Output Summary (Last 24 hours)    Intake/Output Summary (Last 24 hours) at 6/26/2020 1012  Last data filed at 6/26/2020 0606  Gross per 24 hour   Intake --   Output 800 ml   Net -800 ml         Physical Exam:    Abd: soft non tender, fundus firm   Ext: negative marilyn's sign, minimal edema  Lochia: appropriate amount rubra      CBC:   Lab Results   Component Value Date/Time    WBC 16.66 (H) 06/26/2020 04:32 AM    RBC 3.94 (L) 06/26/2020 04:32 AM    HGB 11.4 (L) 06/26/2020 04:32 AM    HGB 13.9 06/27/2013 11:42 AM    HCT 35.1 (L) 06/26/2020 04:32 AM     06/26/2020 04:32 AM    MCV 89 06/26/2020 04:32 AM    MCH 28.9 06/26/2020 04:32 AM    MCHC 32.5 06/26/2020 04:32 AM       ABO/Rh  B POS          ASSESSMENT/PLAN:     S/p vaginal delivery   Patient Active Problem List   Diagnosis    Tobacco use in pregnancy    Pregnancy complication    Normal spontaneous vaginal delivery    Abdominal pain affecting pregnancy    Normal labor    Vaginal delivery        Routine postpartal care     "

## 2020-06-26 NOTE — PLAN OF CARE
VS stable. Pt ambulating and voiding independently. Safety precautions maintained. No questions or concerns at this time. Verbalizing pain management effectively.     Lor Walker RN  4:32 AM

## 2020-06-26 NOTE — ANESTHESIA POSTPROCEDURE EVALUATION
Anesthesia Post Evaluation    Patient: Sanjuanita Vo    Procedure(s) Performed: * No procedures listed *    Final Anesthesia Type: epidural    Patient location during evaluation: floor  Patient participation: Yes- Able to Participate  Level of consciousness: awake and alert  Post-procedure vital signs: reviewed and stable  Pain management: adequate  Airway patency: patent    PONV status at discharge: No PONV  Anesthetic complications: no      Cardiovascular status: stable  Respiratory status: unassisted  Hydration status: euvolemic  Follow-up not needed.    Both lower extremities back to normal from labor epidural.  Ambulating well. No headache or back pain. No anesthesia complications.        Vitals Value Taken Time   /57 06/26/20 0419   Temp 36.9 °C (98.5 °F) 06/26/20 0419   Pulse 74 06/26/20 0419   Resp 20 06/26/20 0606   SpO2 96 % 06/26/20 0419         No case tracking events are documented in the log.      Pain/Precious Score: Pain Rating Prior to Med Admin: 7 (6/26/2020  6:06 AM)

## 2020-06-27 VITALS
OXYGEN SATURATION: 95 % | TEMPERATURE: 98 F | DIASTOLIC BLOOD PRESSURE: 84 MMHG | RESPIRATION RATE: 18 BRPM | BODY MASS INDEX: 31.58 KG/M2 | HEIGHT: 64 IN | SYSTOLIC BLOOD PRESSURE: 135 MMHG | WEIGHT: 185 LBS | HEART RATE: 68 BPM

## 2020-06-27 PROCEDURE — 90715 TDAP VACCINE 7 YRS/> IM: CPT | Performed by: SPECIALIST

## 2020-06-27 PROCEDURE — 25000003 PHARM REV CODE 250: Performed by: SPECIALIST

## 2020-06-27 PROCEDURE — 63600175 PHARM REV CODE 636 W HCPCS: Performed by: SPECIALIST

## 2020-06-27 PROCEDURE — 90471 IMMUNIZATION ADMIN: CPT | Performed by: SPECIALIST

## 2020-06-27 RX ORDER — OXYCODONE AND ACETAMINOPHEN 5; 325 MG/1; MG/1
1-2 TABLET ORAL EVERY 4 HOURS PRN
Qty: 30 TABLET | Refills: 0 | Status: SHIPPED | OUTPATIENT
Start: 2020-06-27

## 2020-06-27 RX ORDER — IBUPROFEN 600 MG/1
600 TABLET ORAL EVERY 6 HOURS PRN
Qty: 30 TABLET | Refills: 0 | Status: SHIPPED | OUTPATIENT
Start: 2020-06-27

## 2020-06-27 RX ADMIN — IBUPROFEN 600 MG: 400 TABLET, FILM COATED ORAL at 08:06

## 2020-06-27 RX ADMIN — OXYCODONE HYDROCHLORIDE AND ACETAMINOPHEN 1 TABLET: 10; 325 TABLET ORAL at 12:06

## 2020-06-27 RX ADMIN — CLOSTRIDIUM TETANI TOXOID ANTIGEN (FORMALDEHYDE INACTIVATED), CORYNEBACTERIUM DIPHTHERIAE TOXOID ANTIGEN (FORMALDEHYDE INACTIVATED), BORDETELLA PERTUSSIS TOXOID ANTIGEN (GLUTARALDEHYDE INACTIVATED), BORDETELLA PERTUSSIS FILAMENTOUS HEMAGGLUTININ ANTIGEN (FORMALDEHYDE INACTIVATED), BORDETELLA PERTUSSIS PERTACTIN ANTIGEN, AND BORDETELLA PERTUSSIS FIMBRIAE 2/3 ANTIGEN 0.5 ML: 5; 2; 2.5; 5; 3; 5 INJECTION, SUSPENSION INTRAMUSCULAR at 02:06

## 2020-06-27 RX ADMIN — DOCUSATE SODIUM 200 MG: 100 CAPSULE, LIQUID FILLED ORAL at 09:06

## 2020-06-27 RX ADMIN — IBUPROFEN 600 MG: 400 TABLET, FILM COATED ORAL at 01:06

## 2020-06-27 RX ADMIN — OXYCODONE HYDROCHLORIDE AND ACETAMINOPHEN 1 TABLET: 10; 325 TABLET ORAL at 08:06

## 2020-06-27 RX ADMIN — OXYCODONE AND ACETAMINOPHEN 1 TABLET: 5; 325 TABLET ORAL at 04:06

## 2020-06-27 NOTE — DISCHARGE INSTRUCTIONS
FOLLOW UP WITH YOUR DOCTOR IN 6 WEEKS OR SOONER FOR ANY PROBLEMS.    Pelvic rest for 6 weeks (no sex, tampons, douching, nothing in the vagina)   You can experience vaginal bleeding on and off for up to 6 weeks, it will gradually get lighter and the color will change from bright red to a brownish discharge towards the end.     Activity:   NO strenuous activities or exercising. Do not /lift anything over 15 pounds. Do not do heavy housework or cleaning.   NO driving for 3 days. You may take short car trips but do not drive.   You may shower and/or soak in a bathtub, both are acceptable. Use a mild soap, no heavy perfumes or fragrances to avoid irritation.   If constipation develops: You may take Colace (stool softener), Milk of Magnesia, Dulcolax or Miralax. All of these medications are sold over the counter.     Care of Episiotomy:   Local agents such as Tucks pads & Dermoplast spray. You may also use a Sitz bath: sitting in a tub of warm water for 15 minutes 2-3 times per day will help relieve the discomfort.     Pain Relief:   You may take Motrin for mild pain & uterine cramping.     Emotional Changes:   You may experience baby blues after delivery. You may feel let down, anxious and cry easily. This is normal. These feelings can begin 2-3 days after delivery and usually disappear in about a week or two. Prolonged sadness may indicate postpartum depression.       ***SEEK IMMEDIATE HELP FROM THE EMERGENCY ROOM IF YOU HAVE ANY CHEST PAIN OR DIFFICULTY BREATHING.    Call your doctor for any of the following:   Problems with any of your medications, inability to eat.   Foul smelling vaginal discharge.   Temperature above 100.4.   Heavy vaginal bleeding. All women bleed different after delivery and each delivery is different. Heavy bleeding consists of saturating a jelani pad in a 1 hour time period. Passing clots are normal, if you pass a blood clot larger than the size of a golf ball call your doctor's office.    If you experience pain in your legs/calves, if one leg increases in size and becomes swollen or becomes hot to touch or discolored.   Crying or periods of sadness beyond 2 weeks.     If you are breast feeding:   Wash your breasts with mild soap and warm water.   You should wear a supportive bra.   You should continue to take a prenatal vitamin for 6 weeks or until breastfeeding is discontinued.   If nipples are sore, apply a few drops of breast milk after nursing and let air dry or you can use Lanolin cream.   If breasts are engorged, apply warmth and express milk.   [facility] lactation consultant is available at [phone] for your breastfeeding needs.    If you are not breastfeeding:   Wear a tight bra and do not stimulate your breasts. Avoid handling your breasts and do not express milk. You may apply ice packs or cabbage leaves to relieve discomfort from engorgement. If your breasts become warm to touch, reddened or lumps develop call your doctor.

## 2020-06-27 NOTE — DISCHARGE SUMMARY
[facility]  Discharge Summary  Obstetrics - Triage      Admit Date: 2020    Discharge Date and Time:  2020 11:37 AM    Attending Physician: Eamon Sood MD     Discharge Provider: Kate Silverio    Reason for Admission: IUP at 36.4, spontaneous active labor    Hospital Course (synopsis of major diagnoses, care, treatment, and services provided during the course of the hospital stay):     Sanjuanita Vo is a 35 y.o.  female who presented to labor and delivery with spontaneous active labor at 36 and 4 7th weeks gestational age.  She had a spontaneous vaginal delivery without complication.  Upon discharge on postpartum day 2,  She is without complaints and her bleeding is minimal.  Throughout her stay her vital signs are stable and she is afebrile.  She will be discharged with instructions, a prescription for pain medicine, and she will follow up with Dr. Sood in 6 weeks.    gen - NAD  Uterus - firm below umbilicus  Ext - no edema, no calf tenderness    She was admitted to the labor and delivery triage area. Vital signs on admit were: Temp: 98.8 °F (37.1 °C), Pulse: 82, Resp: 17, BP: 137/85, SpO2: 96 %.      Final Diagnoses:    Principal Problem: Vaginal delivery   Secondary Diagnoses: none    Discharged Condition: good    Disposition: Home or Self Care    Follow Up/Patient Instructions:     Medications:  Reconciled Home Medications:      Medication List      START taking these medications    lanolin Crea cream  Apply topically as needed for Dry Skin (to nipples).     oxyCODONE-acetaminophen 5-325 mg per tablet  Commonly known as: PERCOCET  Take 1-2 tablets by mouth every 4 (four) hours as needed.        CHANGE how you take these medications    ibuprofen 600 MG tablet  Commonly known as: ADVIL,MOTRIN  Take 1 tablet (600 mg total) by mouth every 6 (six) hours as needed (cramping).  What changed: reasons to take this        CONTINUE taking these medications     butalbital-acetaminophen-caffeine -40 mg -40 mg per tablet  Commonly known as: FIORICET, ESGIC  Take 1 tablet by mouth every 6 (six) hours as needed for Pain.     PRENATAL 1+1 ORAL  Take by mouth.     terbinafine  mg tablet  Commonly known as: LAMISIL  Take 250 mg by mouth once daily.        STOP taking these medications    mupirocin 2 % ointment  Commonly known as: BACTROBAN     norethindrone-ethinyl estradiol 0.5/0.75/1 mg- 35 mcg per tablet  Commonly known as: ORTHO-NOVUM 7/7/7 (28)          Discharge Procedure Orders   Diet Adult Regular     Lifting restrictions   Scheduling Instructions: No lifting greater than 10 # for 6 weeks     Other restrictions (specify):   Order Comments: Pelvic rest x 6 weeks     No driving until:   Order Comments: No driving for 2 weeks     Notify your health care provider if you experience any of the following:  temperature >100.4     Notify your health care provider if you experience any of the following:  persistent nausea and vomiting or diarrhea     Notify your health care provider if you experience any of the following:  severe uncontrolled pain     Notify your health care provider if you experience any of the following:  redness, tenderness, or signs of infection (pain, swelling, redness, odor or green/yellow discharge around incision site)     Notify your health care provider if you experience any of the following:  difficulty breathing or increased cough     Notify your health care provider if you experience any of the following:  severe persistent headache     Notify your health care provider if you experience any of the following:  worsening rash     Notify your health care provider if you experience any of the following:  persistent dizziness, light-headedness, or visual disturbances     Notify your health care provider if you experience any of the following:  increased confusion or weakness     Notify your health care provider if you experience any of  the following:   Order Comments: Call provider for any heavy vaginal bleeding     Shower on day dressing removed (No bath)     Follow-up Information     Eamon Sood MD. Schedule an appointment as soon as possible for a visit in 6 weeks.    Specialty: Obstetrics  Why: For follow up  Contact information:  [address] [place] [address]  [phone]

## 2020-06-27 NOTE — PLAN OF CARE
Pt free of injury and s/s of infection. VVS. Vaginal bleeding minimal. Voiding well. Bonded well with baby. Pain minimal and controlled with po pain meds.

## 2020-09-28 PROBLEM — Z37.9 NORMAL LABOR: Status: RESOLVED | Noted: 2020-06-25 | Resolved: 2020-09-28

## 2021-09-07 ENCOUNTER — HOSPITAL ENCOUNTER (EMERGENCY)
Facility: HOSPITAL | Age: 36
Discharge: HOME | End: 2021-09-07
Attending: EMERGENCY MEDICINE
Payer: MEDICAID

## 2021-09-07 VITALS
WEIGHT: 107 LBS | TEMPERATURE: 97 F | DIASTOLIC BLOOD PRESSURE: 71 MMHG | HEART RATE: 82 BPM | SYSTOLIC BLOOD PRESSURE: 113 MMHG | RESPIRATION RATE: 20 BRPM | OXYGEN SATURATION: 100 % | BODY MASS INDEX: 18.27 KG/M2 | HEIGHT: 64 IN

## 2021-09-07 DIAGNOSIS — R10.13 EPIGASTRIC PAIN: ICD-10-CM

## 2021-09-07 LAB
ALBUMIN SERPL BCP-MCNC: 3.5 G/DL (ref 3.5–5.2)
ALP SERPL-CCNC: 59 U/L (ref 55–135)
ALT SERPL W/O P-5'-P-CCNC: 31 U/L (ref 10–44)
ANION GAP SERPL CALC-SCNC: 9 MMOL/L (ref 8–16)
AST SERPL-CCNC: 26 U/L (ref 10–40)
BASOPHILS # BLD AUTO: 0.08 K/UL (ref 0–0.2)
BASOPHILS NFR BLD: 0.6 % (ref 0–1.9)
BILIRUB SERPL-MCNC: 0.1 MG/DL (ref 0.1–1)
BUN SERPL-MCNC: 9 MG/DL (ref 6–20)
CALCIUM SERPL-MCNC: 8.6 MG/DL (ref 8.7–10.5)
CHLORIDE SERPL-SCNC: 106 MMOL/L (ref 95–110)
CO2 SERPL-SCNC: 27 MMOL/L (ref 23–29)
CREAT SERPL-MCNC: 0.5 MG/DL (ref 0.5–1.4)
DIFFERENTIAL METHOD: ABNORMAL
EOSINOPHIL # BLD AUTO: 0.2 K/UL (ref 0–0.5)
EOSINOPHIL NFR BLD: 1.3 % (ref 0–8)
ERYTHROCYTE [DISTWIDTH] IN BLOOD BY AUTOMATED COUNT: 14.4 % (ref 11.5–14.5)
EST. GFR  (AFRICAN AMERICAN): >60 ML/MIN/1.73 M^2
EST. GFR  (NON AFRICAN AMERICAN): >60 ML/MIN/1.73 M^2
GLUCOSE SERPL-MCNC: 91 MG/DL (ref 70–110)
HCT VFR BLD AUTO: 37.6 % (ref 37–48.5)
HGB BLD-MCNC: 12.2 G/DL (ref 12–16)
IMM GRANULOCYTES # BLD AUTO: 0.04 K/UL (ref 0–0.04)
IMM GRANULOCYTES NFR BLD AUTO: 0.3 % (ref 0–0.5)
LIPASE SERPL-CCNC: 43 U/L (ref 4–60)
LYMPHOCYTES # BLD AUTO: 2.3 K/UL (ref 1–4.8)
LYMPHOCYTES NFR BLD: 17.8 % (ref 18–48)
MCH RBC QN AUTO: 28.5 PG (ref 27–31)
MCHC RBC AUTO-ENTMCNC: 32.4 G/DL (ref 32–36)
MCV RBC AUTO: 88 FL (ref 82–98)
MONOCYTES # BLD AUTO: 1.8 K/UL (ref 0.3–1)
MONOCYTES NFR BLD: 13.8 % (ref 4–15)
NEUTROPHILS # BLD AUTO: 8.4 K/UL (ref 1.8–7.7)
NEUTROPHILS NFR BLD: 66.2 % (ref 38–73)
NRBC BLD-RTO: 0 /100 WBC
PLATELET # BLD AUTO: 526 K/UL (ref 150–450)
PMV BLD AUTO: 8.6 FL (ref 9.2–12.9)
POTASSIUM SERPL-SCNC: 3.2 MMOL/L (ref 3.5–5.1)
PROT SERPL-MCNC: 6.2 G/DL (ref 6–8.4)
RBC # BLD AUTO: 4.28 M/UL (ref 4–5.4)
SODIUM SERPL-SCNC: 142 MMOL/L (ref 136–145)
WBC # BLD AUTO: 12.76 K/UL (ref 3.9–12.7)

## 2021-09-07 PROCEDURE — 93005 ELECTROCARDIOGRAM TRACING: CPT

## 2021-09-07 PROCEDURE — 99284 EMERGENCY DEPT VISIT MOD MDM: CPT

## 2021-09-07 PROCEDURE — 25000003 PHARM REV CODE 250: Performed by: EMERGENCY MEDICINE

## 2021-09-07 PROCEDURE — 80053 COMPREHEN METABOLIC PANEL: CPT | Performed by: EMERGENCY MEDICINE

## 2021-09-07 PROCEDURE — 83690 ASSAY OF LIPASE: CPT | Performed by: EMERGENCY MEDICINE

## 2021-09-07 PROCEDURE — 36415 COLL VENOUS BLD VENIPUNCTURE: CPT | Performed by: EMERGENCY MEDICINE

## 2021-09-07 PROCEDURE — 85025 COMPLETE CBC W/AUTO DIFF WBC: CPT | Performed by: EMERGENCY MEDICINE

## 2021-09-07 RX ORDER — ONDANSETRON 4 MG/1
4 TABLET, ORALLY DISINTEGRATING ORAL
Status: COMPLETED | OUTPATIENT
Start: 2021-09-07 | End: 2021-09-07

## 2021-09-07 RX ORDER — POTASSIUM CHLORIDE 750 MG/1
30 TABLET, EXTENDED RELEASE ORAL
Status: DISCONTINUED | OUTPATIENT
Start: 2021-09-07 | End: 2021-09-07 | Stop reason: HOSPADM

## 2021-09-07 RX ORDER — MAG HYDROX/ALUMINUM HYD/SIMETH 200-200-20
30 SUSPENSION, ORAL (FINAL DOSE FORM) ORAL
Status: COMPLETED | OUTPATIENT
Start: 2021-09-07 | End: 2021-09-07

## 2021-09-07 RX ADMIN — ONDANSETRON 4 MG: 4 TABLET, ORALLY DISINTEGRATING ORAL at 04:09

## 2021-09-07 RX ADMIN — ALUMINUM HYDROXIDE, MAGNESIUM HYDROXIDE, AND SIMETHICONE 30 ML: 200; 200; 20 SUSPENSION ORAL at 04:09

## 2022-06-23 ENCOUNTER — HOSPITAL ENCOUNTER (EMERGENCY)
Facility: HOSPITAL | Age: 37
Discharge: HOME | End: 2022-06-23
Attending: EMERGENCY MEDICINE
Payer: MEDICAID

## 2022-06-23 VITALS
TEMPERATURE: 99 F | OXYGEN SATURATION: 98 % | RESPIRATION RATE: 16 BRPM | HEIGHT: 64 IN | DIASTOLIC BLOOD PRESSURE: 87 MMHG | WEIGHT: 131 LBS | HEART RATE: 76 BPM | BODY MASS INDEX: 22.36 KG/M2 | SYSTOLIC BLOOD PRESSURE: 132 MMHG

## 2022-06-23 DIAGNOSIS — M54.9 MID BACK PAIN: ICD-10-CM

## 2022-06-23 DIAGNOSIS — M54.9 UPPER BACK PAIN: ICD-10-CM

## 2022-06-23 DIAGNOSIS — S39.012A BACK STRAIN, INITIAL ENCOUNTER: Primary | ICD-10-CM

## 2022-06-23 LAB
B-HCG UR QL: NEGATIVE
CTP QC/QA: YES

## 2022-06-23 PROCEDURE — 99284 EMERGENCY DEPT VISIT MOD MDM: CPT | Mod: 25

## 2022-06-23 PROCEDURE — 81025 URINE PREGNANCY TEST: CPT | Performed by: PHYSICIAN ASSISTANT

## 2022-06-23 PROCEDURE — 93010 EKG 12-LEAD: ICD-10-PCS | Mod: ,,, | Performed by: SPECIALIST

## 2022-06-23 PROCEDURE — 25000003 PHARM REV CODE 250: Performed by: PHYSICIAN ASSISTANT

## 2022-06-23 PROCEDURE — 93010 ELECTROCARDIOGRAM REPORT: CPT | Mod: ,,, | Performed by: SPECIALIST

## 2022-06-23 PROCEDURE — 93005 ELECTROCARDIOGRAM TRACING: CPT | Performed by: SPECIALIST

## 2022-06-23 RX ORDER — METHOCARBAMOL 750 MG/1
750 TABLET, FILM COATED ORAL 3 TIMES DAILY
Qty: 21 TABLET | Refills: 0 | Status: SHIPPED | OUTPATIENT
Start: 2022-06-23 | End: 2022-06-30

## 2022-06-23 RX ORDER — NAPROXEN 250 MG/1
500 TABLET ORAL
Status: COMPLETED | OUTPATIENT
Start: 2022-06-23 | End: 2022-06-23

## 2022-06-23 RX ORDER — NAPROXEN 500 MG/1
500 TABLET ORAL 2 TIMES DAILY WITH MEALS
Qty: 14 TABLET | Refills: 0 | Status: SHIPPED | OUTPATIENT
Start: 2022-06-23 | End: 2022-06-30

## 2022-06-23 RX ORDER — METHOCARBAMOL 500 MG/1
500 TABLET, FILM COATED ORAL
Status: COMPLETED | OUTPATIENT
Start: 2022-06-23 | End: 2022-06-23

## 2022-06-23 RX ADMIN — METHOCARBAMOL 500 MG: 500 TABLET ORAL at 03:06

## 2022-06-23 RX ADMIN — NAPROXEN 500 MG: 250 TABLET ORAL at 03:06

## 2022-06-23 NOTE — ED PROVIDER NOTES
Encounter Date: 6/23/2022       History     Chief Complaint   Patient presents with    Back Pain     Middle to upper back pain.  No trauma noted.     Sanjuanita Vo is a 37 y.o. female presents with middle to upper back pain that started today.  Patient reports she was carrying a pack of water, going up the stairs in her front porch, when she felt a sudden pain that radiated from her lower back to her upper back.  She denies similar episode in the past.  Patient denies taking any pain medication prior to arrival.  Patient reports the pain was so severe she had to lay down on the floor.  She denies any numbness, weakness, headache, dizziness, or any trauma to area.  Patient reports she does chronic lower back pain secondary to multiple years of dance.  Pain is aggravated with movement.        Review of patient's allergies indicates:  No Known Allergies  Past Medical History:   Diagnosis Date    Abnormal Pap smear of cervix      Past Surgical History:   Procedure Laterality Date    DILATION AND CURETTAGE OF UTERUS  2007    DILATION AND CURETTAGE OF UTERUS  2007     No family history on file.  Social History     Tobacco Use    Smoking status: Current Every Day Smoker     Packs/day: 0.75     Years: 15.00     Pack years: 11.25    Smokeless tobacco: Never Used   Substance Use Topics    Alcohol use: Not Currently     Comment: occasionally    Drug use: No     Review of Systems   Constitutional: Negative for fatigue and fever.   Respiratory: Negative for cough, chest tightness, shortness of breath and stridor.    Cardiovascular: Negative for chest pain, palpitations and leg swelling.   Genitourinary: Negative for difficulty urinating and dysuria.   Musculoskeletal: Positive for back pain. Negative for arthralgias, gait problem, joint swelling, neck pain and neck stiffness.   Skin: Negative for pallor, rash and wound.   Neurological: Negative for dizziness, tremors, weakness, light-headedness, numbness and headaches.    Hematological: Does not bruise/bleed easily.       Physical Exam     Initial Vitals [06/23/22 1446]   BP Pulse Resp Temp SpO2   (!) 133/92 76 16 98.6 °F (37 °C) 98 %      MAP       --         Physical Exam    Nursing note reviewed.  Constitutional: She appears well-developed and well-nourished.   HENT:   Head: Normocephalic and atraumatic.   Neck: Neck supple.   Normal range of motion.  Cardiovascular: Normal rate, regular rhythm, normal heart sounds and intact distal pulses.   Pulmonary/Chest: Breath sounds normal. No respiratory distress.   Abdominal: Abdomen is soft.   Musculoskeletal:         General: No edema.      Cervical back: Normal range of motion and neck supple.      Thoracic back: Spasms and tenderness present. No swelling, edema, deformity, signs of trauma, lacerations or bony tenderness. Normal range of motion (Secondary to pain). No scoliosis.      Lumbar back: Spasms and tenderness present. No swelling, edema, deformity, signs of trauma, lacerations or bony tenderness. Decreased range of motion (Secondary to pain). Negative right straight leg raise test and negative left straight leg raise test. No scoliosis.     Neurological: She is alert and oriented to person, place, and time. She has normal strength and normal reflexes. GCS score is 15. GCS eye subscore is 4. GCS verbal subscore is 5. GCS motor subscore is 6.   Skin: Skin is warm. Capillary refill takes less than 2 seconds.   Psychiatric: She has a normal mood and affect. Her behavior is normal. Judgment and thought content normal.         ED Course   Procedures  Labs Reviewed   POCT URINE PREGNANCY        ECG Results          EKG 12-lead (Final result)  Result time 06/25/22 12:31:26    Final result by Interface, Lab In [facility] (06/25/22 12:31:26)                 Narrative:    Test Reason : M54.9,    Vent. Rate : 054 BPM     Atrial Rate : 054 BPM     P-R Int : 152 ms          QRS Dur : 088 ms      QT Int : 416 ms       P-R-T Axes : 006 049 010  degrees     QTc Int : 394 ms    Sinus bradycardia  Otherwise normal ECG  When compared with ECG of 07-SEP-2021 04:32,  QT has shortened  Confirmed by Sameer DIALLO, Lloyd BERRIOS ([id number]) on 6/25/2022 12:31:15 PM    Referred By: SADIA   SELF           Confirmed By:Lloyd Estrella MD                            Imaging Results          X-Ray Chest PA And Lateral (Final result)  Result time 06/23/22 15:29:30    Final result by Black Thompson MD (06/23/22 15:29:30)                 Narrative:    CHEST PA AND LATERAL    CLINICAL DATA:Chest pain.    FINDINGS: PA and lateral views demonstrate no cardiac, pulmonary, or osseous abnormalities.    IMPRESSION:  1. Normal two-view chest.    Electronically signed by:  Black Thompson MD  6/23/2022 3:29 PM CDT Workstation: [id number]                               Medications   methocarbamoL tablet 500 mg (500 mg Oral Given 6/23/22 1524)   naproxen tablet 500 mg (500 mg Oral Given 6/23/22 1524)     Medical Decision Making:   Initial Assessment:   37-year-old female presents with back pain while carrying a pack of water up the stairs.  On exam she is in no acute distress, laying comfortably in the stretcher, patient has tenderness to palpation to the paraspinal lumbar region.  Pain is also aggravated with movement or twisting of the torso.  Differential Diagnosis:   Muscular spasm, contusion, fracture, dislocation  Clinical Tests:   Radiological Study: Ordered and Reviewed  Medical Tests: Ordered and Reviewed  ED Management:  Chest x-ray, pregnancy test, EKG and meds were ordered in triage.  Imaging was negative.  EKG was normal sinus.  Patient was given naproxen and Robaxin.  I have re-evaluation patient reports feeling better.  She will be discharged with a script for Robaxin to continue at home.  She will also be discharged with instructions for exercising and stretching of her back.            Attending Attestation:   Physician Attestation Statement for Resident:  As the  supervising MD   Physician Attestation Statement: I have personally seen and examined this patient.   I agree with the above history. -:   As the supervising MD I agree with the above PE.    As the supervising MD I agree with the above treatment, course, plan, and disposition.                ED Course as of 06/26/22 1706   Thu Jun 23, 2022   1532 X-Ray Chest PA And Lateral  . Normal two-view chest [MR]   1532 Preg Test, Ur: Negative [MR]      ED Course User Index  [MR] Dominique Brush MD             Clinical Impression:   Final diagnoses:  [M54.9] Upper back pain  [M54.9] Mid back pain  [S39.012A] Back strain, initial encounter (Primary)          ED Disposition Condition    Discharge Stable        ED Prescriptions     Medication Sig Dispense Start Date End Date Auth. Provider    methocarbamoL (ROBAXIN) 750 MG Tab Take 1 tablet (750 mg total) by mouth 3 (three) times daily. for 7 days 21 tablet 6/23/2022 6/30/2022 Dominique Brush MD    naproxen (NAPROSYN) 500 MG tablet Take 1 tablet (500 mg total) by mouth 2 (two) times daily with meals. for 7 days 14 tablet 6/23/2022 6/30/2022 Dominique Brush MD        Follow-up Information     Follow up With Specialties Details Why Contact Info Additional Information    [facility] - Emergency Dept Emergency Medicine  If symptoms worsen [address] [facility] [address]  [phone] 1st floor    Lakisha Stark NP Internal Medicine Schedule an appointment as soon as possible for a visit  As needed [address] [place] [id number]  [phone]              Dominique Brush MD  Resident  06/24/22 0034       Gonsalo Key MD  06/26/22 4906

## 2022-06-23 NOTE — FIRST PROVIDER EVALUATION
Emergency Department TeleTriage Encounter Note      CHIEF COMPLAINT    Chief Complaint   Patient presents with    Back Pain     Middle to upper back pain.  No trauma noted.       VITAL SIGNS   Initial Vitals [06/23/22 1446]   BP Pulse Resp Temp SpO2   (!) 133/92 76 16 98.6 °F (37 °C) 98 %      MAP       --            ALLERGIES    Review of patient's allergies indicates:  No Known Allergies    PROVIDER TRIAGE NOTE  This is a teletriage evaluation of a 37 y.o. female presenting to the ED with c/o atraumatic abrupt mid back pain while walking, now worsens with movement and inspiration. H/o low back issue. No rad to abdomen. No anterior chest pain. No IVDU, fever, BLE neuro complaints.     PE:. Non-toxic/well-appearing. No respiratory distress, speaks in full sentences without issue. No active emesis nor cough. Normal eye contact and mentation.     Plan: EKG, CXR, meds. Further/augmented workup at discretion of examining provider.     All ED beds are full at present; patient notified of this status.  Patient seen and medically screened by JESSICA via teletriage. Orders initiated at triage to expedite care.  Patient is stable and will be placed in an ED bed when available.  Care will be transferred to an alternate provider when patient has been placed in an Exam Room further exam, additional orders, and disposition.         ORDERS  Labs Reviewed   POCT URINE PREGNANCY       ED Orders (720h ago, onward)    Start Ordered     Status Ordering Provider    06/23/22 1500 06/23/22 1459  methocarbamoL tablet 500 mg  ED 1 Time         Ordered TASHI CHASE    06/23/22 1500 06/23/22 1459  naproxen tablet 500 mg  ED 1 Time         Ordered TASHI CHASE    06/23/22 1459 06/23/22 1458  POCT urine pregnancy  Once         Ordered TASHI CHASE    06/23/22 1459 06/23/22 1458  EKG 12-lead  Once         Ordered TASHI CHASE    06/23/22 1459 06/23/22 1459  X-Ray Chest PA And Lateral  1 time imaging         Ordered RENA  TASHI BERRIOS            Virtual Visit Note: The provider triage portion of this emergency department evaluation and documentation was performed via [employer], a HIPAA-compliant telemedicine application, in concert with a tele-presenter in the room. A face to face patient evaluation with one of my colleagues will occur once the patient is placed in an emergency department room.      DISCLAIMER: This note was prepared with [employer] voice recognition transcription software. Garbled syntax, mangled pronouns, and other bizarre constructions may be attributed to that software system.

## 2025-03-16 ENCOUNTER — HOSPITAL ENCOUNTER (EMERGENCY)
Facility: HOSPITAL | Age: 40
Discharge: HOME | End: 2025-03-17
Attending: EMERGENCY MEDICINE
Payer: MEDICAID

## 2025-03-16 DIAGNOSIS — J10.1 INFLUENZA A: Primary | ICD-10-CM

## 2025-03-16 PROCEDURE — 87651 STREP A DNA AMP PROBE: CPT | Performed by: EMERGENCY MEDICINE

## 2025-03-16 PROCEDURE — 87502 INFLUENZA DNA AMP PROBE: CPT | Performed by: EMERGENCY MEDICINE

## 2025-03-16 PROCEDURE — 87635 SARS-COV-2 COVID-19 AMP PRB: CPT | Performed by: EMERGENCY MEDICINE

## 2025-03-16 PROCEDURE — 99283 EMERGENCY DEPT VISIT LOW MDM: CPT

## 2025-03-16 NOTE — Clinical Note
"Sanjuanita Granta"Gilda was seen and treated in our emergency department on 3/16/2025.  She may return to work on 03/19/2025.       If you have any questions or concerns, please don't hesitate to call.      Ana GEORGES    "

## 2025-03-17 VITALS
SYSTOLIC BLOOD PRESSURE: 116 MMHG | BODY MASS INDEX: 22.2 KG/M2 | RESPIRATION RATE: 20 BRPM | OXYGEN SATURATION: 97 % | HEART RATE: 94 BPM | TEMPERATURE: 99 F | HEIGHT: 64 IN | DIASTOLIC BLOOD PRESSURE: 65 MMHG | WEIGHT: 130 LBS

## 2025-03-17 LAB
GROUP A STREP, MOLECULAR: NEGATIVE
INFLUENZA A, MOLECULAR: POSITIVE
INFLUENZA B, MOLECULAR: NEGATIVE
SARS-COV-2 RDRP RESP QL NAA+PROBE: NEGATIVE
SPECIMEN SOURCE: ABNORMAL

## 2025-03-17 PROCEDURE — 25000003 PHARM REV CODE 250: Performed by: EMERGENCY MEDICINE

## 2025-03-17 RX ORDER — IBUPROFEN 400 MG/1
400 TABLET ORAL
Status: DISCONTINUED | OUTPATIENT
Start: 2025-03-17 | End: 2025-03-17 | Stop reason: HOSPADM

## 2025-03-17 RX ORDER — OSELTAMIVIR PHOSPHATE 75 MG/1
75 CAPSULE ORAL 2 TIMES DAILY
Qty: 10 CAPSULE | Refills: 0 | Status: SHIPPED | OUTPATIENT
Start: 2025-03-17 | End: 2025-03-22

## 2025-03-17 RX ORDER — OSELTAMIVIR PHOSPHATE 75 MG/1
75 CAPSULE ORAL
Status: COMPLETED | OUTPATIENT
Start: 2025-03-17 | End: 2025-03-17

## 2025-03-17 RX ADMIN — OSELTAMIVIR PHOSPHATE 75 MG: 75 CAPSULE ORAL at 12:03

## 2025-03-17 NOTE — ED PROVIDER NOTES
Encounter Date: 3/16/2025       History     Chief Complaint   Patient presents with    Fever     X 4 hours, took tylenol 650mg       Patient presents emergency department with reported fever onset proximally 4 hours ago associated cough congestion malaise myalgias no nausea vomiting diarrhea states her children had just recovered from upper respiratory illness        Review of patient's allergies indicates:  No Known Allergies  Past Medical History:   Diagnosis Date    Abnormal Pap smear of cervix      Past Surgical History:   Procedure Laterality Date    DILATION AND CURETTAGE OF UTERUS  2007    DILATION AND CURETTAGE OF UTERUS  2007     No family history on file.  Social History[1]  Review of Systems   Constitutional:  Positive for chills, fatigue and fever.   HENT:  Positive for congestion.    Respiratory:  Positive for cough.    Gastrointestinal:  Negative for diarrhea and vomiting.   Musculoskeletal:  Positive for myalgias.   All other systems reviewed and are negative.      Physical Exam     Initial Vitals   BP Pulse Resp Temp SpO2   03/16/25 2338 03/16/25 2338 03/16/25 2338 03/16/25 2340 03/16/25 2338   121/71 98 20 98.8 °F (37.1 °C) 100 %      MAP       --                Physical Exam    Constitutional: She appears well-developed and well-nourished. No distress.   HENT:   Head: Normocephalic and atraumatic.   Right Ear: External ear normal.   Left Ear: External ear normal. Mouth/Throat: Oropharynx is clear and moist.   Eyes: Conjunctivae and EOM are normal. Pupils are equal, round, and reactive to light.   Neck: Neck supple.   Normal range of motion.  Cardiovascular:  Normal rate, regular rhythm, normal heart sounds and intact distal pulses.           Pulmonary/Chest: Breath sounds normal. No respiratory distress.   Abdominal: Abdomen is soft. Bowel sounds are normal. There is no abdominal tenderness.   Musculoskeletal:         General: No edema. Normal range of motion.      Cervical back: Normal range of  motion and neck supple.     Neurological: She is alert and oriented to person, place, and time. GCS score is 15. GCS eye subscore is 4. GCS verbal subscore is 5. GCS motor subscore is 6.   Skin: Skin is warm and dry. Capillary refill takes less than 2 seconds. No rash noted.   Psychiatric: She has a normal mood and affect. Her behavior is normal.         ED Course   Procedures  Labs Reviewed   INFLUENZA A & B BY MOLECULAR - Abnormal       Result Value    Influenza A, Molecular Positive (*)     Influenza B, Molecular Negative      Flu A & B Source NP      Narrative:     Influenza A critical result(s) called and verbal readback obtained   from Martin Oneill RN.  by MIK 03/17/2025 00:14   GROUP A STREP, MOLECULAR    Group A Strep, Molecular Negative     SARS-COV-2 RNA AMPLIFICATION, QUAL    SARS-CoV-2 RNA, Amplification, Qual Negative            Imaging Results    None          Medications   ibuprofen tablet 400 mg (400 mg Oral Not Given 3/17/25 0030)   oseltamivir capsule 75 mg (75 mg Oral Given 3/17/25 0030)     Medical Decision Making  Laboratory evaluation reviewed patient is influenza A positive patient received a dose of Tamiflu in the emergency department will continue Tamiflu at home   return precautions Tylenol Motrin for fever control    Amount and/or Complexity of Data Reviewed  Labs: ordered. Decision-making details documented in ED Course.    Risk  Prescription drug management.                                      Clinical Impression:  Final diagnoses:  [J10.1] Influenza A (Primary)          ED Disposition Condition    Discharge Stable          ED Prescriptions       Medication Sig Dispense Start Date End Date Auth. Provider    oseltamivir (TAMIFLU) 75 MG capsule Take 1 capsule (75 mg total) by mouth 2 (two) times daily. for 5 days 10 capsule 3/17/2025 3/22/2025 Felipe Lomax MD          Follow-up Information       Follow up With Specialties Details Why Contact Lakisha Salinas NP Internal  Medicine In 1 day for re-examination of your symptoms [address] [place] [address] [phone]                 [1]   Social History  Tobacco Use    Smoking status: Every Day     Current packs/day: 0.75     Average packs/day: 0.8 packs/day for 15.0 years (11.3 ttl pk-yrs)     Types: Cigarettes    Smokeless tobacco: Never   Substance Use Topics    Alcohol use: Not Currently     Comment: occasionally    Drug use: No        Felipe Lomax MD  03/17/25 0034